# Patient Record
Sex: FEMALE | HISPANIC OR LATINO | URBAN - METROPOLITAN AREA
[De-identification: names, ages, dates, MRNs, and addresses within clinical notes are randomized per-mention and may not be internally consistent; named-entity substitution may affect disease eponyms.]

---
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
## 2025-01-15 NOTE — ED PEDIATRIC NURSE NOTE - BREATHING, MLM
Message to provider:  Left message for patient to call office to schedule a follow up appt    [] Writer advised caller of callback from clinic within 24-72 hours  
Spontaneous, unlabored and symmetrical